# Patient Record
Sex: FEMALE | Race: WHITE | Employment: FULL TIME | URBAN - METROPOLITAN AREA
[De-identification: names, ages, dates, MRNs, and addresses within clinical notes are randomized per-mention and may not be internally consistent; named-entity substitution may affect disease eponyms.]

---

## 2017-02-16 PROBLEM — R07.89 CHEST DISCOMFORT: Status: ACTIVE | Noted: 2017-02-16

## 2017-02-16 PROBLEM — E11.9 TYPE II DIABETES MELLITUS (HCC): Status: ACTIVE | Noted: 2017-02-16

## 2017-02-16 PROBLEM — R55 NEAR SYNCOPE: Status: ACTIVE | Noted: 2017-02-16

## 2017-02-16 PROBLEM — I70.219 ATHEROSCLEROSIS WITH CLAUDICATION OF EXTREMITY (HCC): Status: ACTIVE | Noted: 2017-02-16

## 2017-02-16 PROBLEM — E78.5 DYSLIPIDEMIA: Status: ACTIVE | Noted: 2017-02-16

## 2017-03-10 ENCOUNTER — HOSPITAL ENCOUNTER (OUTPATIENT)
Dept: NON INVASIVE DIAGNOSTICS | Age: 62
Discharge: HOME | End: 2017-03-10
Attending: INTERNAL MEDICINE | Admitting: INTERNAL MEDICINE
Payer: COMMERCIAL

## 2017-03-10 VITALS
SYSTOLIC BLOOD PRESSURE: 140 MMHG | HEIGHT: 66 IN | OXYGEN SATURATION: 96 % | HEART RATE: 70 BPM | RESPIRATION RATE: 27 BRPM | BODY MASS INDEX: 29.25 KG/M2 | DIASTOLIC BLOOD PRESSURE: 78 MMHG | WEIGHT: 182 LBS | TEMPERATURE: 97.9 F

## 2017-03-10 LAB
ANION GAP BLD CALC-SCNC: 9 MMOL/L (ref 7–16)
ATRIAL RATE: 83 BPM
BUN SERPL-MCNC: 10 MG/DL (ref 8–23)
CALCIUM SERPL-MCNC: 8.9 MG/DL (ref 8.3–10.4)
CALCULATED P AXIS, ECG09: 66 DEGREES
CALCULATED R AXIS, ECG10: -176 DEGREES
CALCULATED T AXIS, ECG11: 77 DEGREES
CHLORIDE SERPL-SCNC: 109 MMOL/L (ref 98–107)
CO2 SERPL-SCNC: 26 MMOL/L (ref 21–32)
CREAT SERPL-MCNC: 0.62 MG/DL (ref 0.6–1)
DIAGNOSIS, 93000: NORMAL
DIASTOLIC BP, ECG02: NORMAL MMHG
ERYTHROCYTE [DISTWIDTH] IN BLOOD BY AUTOMATED COUNT: 12.1 % (ref 11.9–14.6)
GLUCOSE BLD STRIP.AUTO-MCNC: 168 MG/DL (ref 65–100)
GLUCOSE SERPL-MCNC: 137 MG/DL (ref 65–100)
HCT VFR BLD AUTO: 38.6 % (ref 35.8–46.3)
HGB BLD-MCNC: 13.2 G/DL (ref 11.7–15.4)
MCH RBC QN AUTO: 34 PG (ref 26.1–32.9)
MCHC RBC AUTO-ENTMCNC: 34.2 G/DL (ref 31.4–35)
MCV RBC AUTO: 99.5 FL (ref 79.6–97.8)
P-R INTERVAL, ECG05: 134 MS
PLATELET # BLD AUTO: 261 K/UL (ref 150–450)
PMV BLD AUTO: 9.8 FL (ref 10.8–14.1)
POTASSIUM SERPL-SCNC: 4.4 MMOL/L (ref 3.5–5.1)
Q-T INTERVAL, ECG07: 396 MS
QRS DURATION, ECG06: 80 MS
QTC CALCULATION (BEZET), ECG08: 465 MS
RBC # BLD AUTO: 3.88 M/UL (ref 4.05–5.25)
SODIUM SERPL-SCNC: 144 MMOL/L (ref 136–145)
SYSTOLIC BP, ECG01: NORMAL MMHG
VENTRICULAR RATE, ECG03: 83 BPM
WBC # BLD AUTO: 4.7 K/UL (ref 4.3–11.1)

## 2017-03-10 PROCEDURE — 93005 ELECTROCARDIOGRAM TRACING: CPT | Performed by: INTERNAL MEDICINE

## 2017-03-10 PROCEDURE — 74011000258 HC RX REV CODE- 258: Performed by: INTERNAL MEDICINE

## 2017-03-10 PROCEDURE — 93660 TILT TABLE EVALUATION: CPT

## 2017-03-10 PROCEDURE — 80048 BASIC METABOLIC PNL TOTAL CA: CPT | Performed by: INTERNAL MEDICINE

## 2017-03-10 PROCEDURE — 74011250637 HC RX REV CODE- 250/637: Performed by: INTERNAL MEDICINE

## 2017-03-10 PROCEDURE — 82962 GLUCOSE BLOOD TEST: CPT

## 2017-03-10 PROCEDURE — 85027 COMPLETE CBC AUTOMATED: CPT | Performed by: INTERNAL MEDICINE

## 2017-03-10 RX ORDER — NITROGLYCERIN 400 UG/1
1 SPRAY ORAL AS NEEDED
Status: DISCONTINUED | OUTPATIENT
Start: 2017-03-10 | End: 2017-03-10 | Stop reason: HOSPADM

## 2017-03-10 RX ORDER — SODIUM CHLORIDE 9 MG/ML
75 INJECTION, SOLUTION INTRAVENOUS CONTINUOUS
Status: DISCONTINUED | OUTPATIENT
Start: 2017-03-10 | End: 2017-03-10 | Stop reason: HOSPADM

## 2017-03-10 RX ADMIN — SODIUM CHLORIDE 75 ML/HR: 900 INJECTION, SOLUTION INTRAVENOUS at 08:00

## 2017-03-10 RX ADMIN — NITROGLYCERIN 1 SPRAY: 400 SPRAY, METERED SUBLINGUAL at 08:35

## 2017-03-10 NOTE — PROGRESS NOTES
Procedure completed, pt did get \"lightheaded\" but no real changes in vital signs. Dr. Jovani Kruse here the entire time, finger bs 168.

## 2017-03-10 NOTE — IP AVS SNAPSHOT
[address] 
[phone] Patient: Nikolai Simeon MRN: [id number] SVF:0/10/6424 Discharge Summary 3/10/2017 Nikolai Simeon MRN[de-identified]  [id number] Admission Information Provider Pager Service Admission Date Expected D/C Date Maritza Gregory MD  CARDIAC CATH LAB 3/10/2017 Actual LOS Patient Class 0 days OUTPATIENT Follow-up Information Follow up With Details Comments Contact Info Mohit Henriquez MD Schedule an appointment as soon as possible for a visit in 2 weeks Evaluate and treat vertigo not responding to conservative therapy. MRI ordered. Augustina Moralez [address] [facility] [address] 
[phone] Maritza Gregory MD Schedule an appointment as soon as possible for a visit in 2 weeks please make sure appointment is after BP monitor (office will call her when monitor available) [address] [facility] [address] 
[phone] Mohit Henriquez MD   [address] [place] [address] [address] [facility] [address] 
[phone] Maritza Gregory MD   [facility] [address] [facility] [address] 
[phone] Current Discharge Medication List  
  
ASK your doctor about these medications Dose & Instructions Dispensing Information Comments Morning Noon Evening Bedtime  
 calcium-magnesium-zinc 333-133-5 mg Tab Your next dose is: Today, Tomorrow Other:  _________ Take  by mouth. Refills:  0  
     
   
   
   
  
 HUMIRA PEN 40 mg/0.8 mL Pnkt Generic drug:  adalimumab Your next dose is: Today, Tomorrow Other:  _________  
   
   
 by SubCUTAneous route. Refills:  0  
     
   
   
   
  
 metFORMIN 1,000 mg tablet Commonly known as:  GLUCOPHAGE Your next dose is: Today, Tomorrow Other:  _________  Dose:  1000 mg  
 Take 1,000 mg by mouth two (2) times a day. Refills:  0 NovoLOG 100 unit/mL injection Generic drug:  insulin aspart Your next dose is: Today, Tomorrow Other:  _________  
   
   
 by SubCUTAneous route. At noon meal takes novolog on a sliding scale Refills:  0  
     
   
   
   
  
 potassium 99 mg tablet Your next dose is: Today, Tomorrow Other:  _________ Dose:  99 mg Take 99 mg by mouth two (2) times a day. Refills:  0 PriLOSEC 20 mg capsule Generic drug:  omeprazole Your next dose is: Today, Tomorrow Other:  _________ Dose:  20 mg Take 20 mg by mouth two (2) times a day. prn Refills:  0  
     
   
   
   
  
 simvastatin 40 mg tablet Commonly known as:  ZOCOR Your next dose is: Today, Tomorrow Other:  _________ Dose:  40 mg Take 40 mg by mouth nightly. Refills:  0  
     
   
   
   
  
 TOUJEO SOLOSTAR SC Your next dose is: Today, Tomorrow Other:  _________  
   
   
 by SubCUTAneous route. Refills:  0  
     
   
   
   
  
 VITAMIN D3 2,000 unit Tab Generic drug:  cholecalciferol (vitamin D3) Your next dose is: Today, Tomorrow Other:  _________ Take  by mouth. Refills:  0 WELCHOL 625 mg tablet Generic drug:  colesevelam  
   
Your next dose is: Today, Tomorrow Other:  _________ Dose:  1875 mg Take 1,875 mg by mouth two (2) times daily (with meals). Refills:  0 General Information Please provide this summary of care documentation to your next provider. Allergies Medium:  Adhesive Unspecified:  Codeine Current Immunizations  Never Reviewed No immunizations on file. Discharge Instructions Discharge Instructions After your tilt table test 
 
 Be sure someone else drives you home. You have no restrictions:  
Return to your previous diet. Increase your fluid & salt intake Return to your previous activities. Continue current medications Call your doctor if: 
 
· You have trouble breathing all of a sudden. · You have chest pain or any pain that spreads to your neck, jaw, or arms. · You have questions or concerns. · You have a fever greater than 101°F. 
· You have increase in dizzy or fainting spells [facility] will call you with a time to  a blood pressure machine, they will also call you with your appointments to see Dr. Kanika Gaspar and Dr. Tavo Razo (ENT) and your appointment date and time for your CT scan. If you have any questions or concerns please call the office at [phone] Discharge Orders None  
  
` Patient Signature:  ____________________________________________________________ Date:  ____________________________________________________________  
  
 Milagro Sleight Provider Signature:  ____________________________________________________________ Date:  ____________________________________________________________

## 2017-03-10 NOTE — DISCHARGE INSTRUCTIONS
After your tilt table test    Be sure someone else drives you home. You have no restrictions:   Return to your previous diet. Increase your fluid & salt intake   Return to your previous activities. Continue current medications       Call your doctor if:    · You have trouble breathing all of a sudden. · You have chest pain or any pain that spreads to your neck, jaw, or arms. · You have questions or concerns. · You have a fever greater than 101°F.  · You have increase in dizzy or fainting spells        [facility] will call you with a time to  a blood pressure machine, they will also call you with your appointments to see Dr. Kathie Osler and Dr. Michelle Haynes (ENT) and your appointment date and time for your CT scan.  If you have any questions or concerns please call the office at [phone]

## 2017-03-10 NOTE — PROCEDURES
Procedure: Tilt Table Monitoring with drug provocation    Referring Physician: Madison Crespo MD  Performing Physician: Rommel Silva MD      Indication: See history and physical for complete details. Briefly, procedure is being performed for a history of dizziness/near syncope. PROCEDURE:  Patient was monitored in the supine position for 10 minutes, then in the 70 degree head upright tilt position for 10 minutes. Having remained stable, patient was then administered 0.4 mg sublingual nitroglycerin and monitored an additional 10 minutes. FINDINGS: Baseline bp and heart rate ranged from 622-622 systolic, and 04-84, respectively. With upright tilt there was  No significant change in hemodynamics. The heart rate ashlyn appropriately and slightly, initial bp feel from 150-136 but remained stable in the 140's thereafter. She experienced worsening of her dizziness, which was present in supine position, during upright motion, and this was associated with several beats of right sided nystagmus. Her symptoms returned to baseline with cessation of motion. After administration of nitroglycerin, there was appropriate modest fall in bp and heart rate. The patient began to feel as though she were \"floating\" 8 minutes into administration. Heart rate was in the 90's and BP briefly ashlyn to 170 but then returned to baseline 130's-140's where is remained. Symptoms improved back to baseline slowly and again with cessation of motion after table returned to supine position. IMPRESSION:    Essentially normal tilt table with and without drug. There were very brief, transient moments of BP drop of up to 20 points, however never below 906 systolic and no correlation to symptoms. Positional vertigo, not responding to therapy    Resting hypertension          RECOMMENDATIONS:    1. Consult her ENT, Dr. aDnia West, for further evaluation and treatment of vertigo.   Will obtain MRI prior to visit.    2. 24 hour ambulatory BP monitor to assess BP control prior to committing to therapy      FOLLOW UP:    1. With me after BP monitor  2. Consult Dr Miles Lamar  3.  With Dr Silvia Casiano as scheduled      Raven Duncan MD  8:59 AM

## 2017-03-10 NOTE — PROGRESS NOTES
Patient received to [address] [place] [address] room # 6  Ambulatory from [facility]. Patient scheduled for Tilt-Table today with Dr Yashira Rao. Procedure reviewed & questions answered, voiced good understanding consent obtained & placed on chart. All medications and medical history reviewed. Will prep patient per orders. Patient & family updated on plan of care.

## 2017-03-10 NOTE — PROGRESS NOTES
Patient discharged to home with spouse. All belongings returned to patient. Discharge instructions reviewed and opportunity for questions provided.

## 2017-03-10 NOTE — H&P
Patient presents for tilt table testing for dizziness present for 3+ weeks, clinically consistent with vertigo but poor response to therapy. History and physical below are reviewed and there has been no significant interval change:    Consult for dizziness, for about 3 weeks has felt as though she is going to pass out all the time. Has had sinus congestion, at her primary care doctor's office apparently had orthostatic blood pressures. She had carotid dopplers on Monday at [address], she hasn't heard results. Worse with standing or turning her head, especially worse turning to the right, has lost her balance to the point of running into a table, holding on the walls at the house. Started with a sinus infection, has been fighting a head cold since December, that part has not gotten better. Has not been taking antihistamine. Some tingling in the right arm. No chest pain. Prior to this was still exercising, walking.               Past Medical History, Past Surgical History, Family history, Social History, and Medications were all reviewed with the patient today and updated as necessary.              Prior to Admission medications    Medication Sig Start Date End Date Taking? Authorizing Provider   [facility]) 625 mg tablet Take 1,875 mg by mouth two (2) times daily (with meals).     Yes Historical Provider   INSULIN GLARGINE,HUM. REC. ANLOG (TOUJEO SOLOSTAR SC) by SubCUTAneous route.     Yes Historical Provider   calcium-magnesium-zinc 333-133-5 mg tab Take by mouth.     Yes Historical Provider   cholecalciferol, vitamin D3, (VITAMIN D3) 2,000 unit tab Take by mouth.     Yes Historical Provider   adalimumab (HUMIRA PEN) 40 mg/0.8 mL pnkt by SubCUTAneous route.     Yes Historical Provider   omeprazole (PRILOSEC) 20 mg capsule Take 20 mg by mouth two (2) times a day.  prn     Yes Historical Provider   metFORMIN (GLUCOPHAGE) 1,000 mg tablet Take 1,000 mg by mouth two (2) times a day.     Yes Historical Provider potassium 99 mg tablet Take 99 mg by mouth two (2) times a day.     Yes Historical Provider   insulin aspart (NOVOLOG) 100 unit/mL injection by SubCUTAneous route. At noon meal takes novolog on a sliding scale     Yes Historical Provider   simvastatin (ZOCOR) 40 mg tablet Take 40 mg by mouth nightly.     Yes Historical Provider            Allergies   Allergen Reactions    Adhesive Rash       Skin irritation    Codeine Nausea and Vomiting           Past Medical History:   Diagnosis Date    Arthritis      Chest discomfort 2/16/2017    Diabetes ([address]      GERD (gastroesophageal reflux disease)              Past Surgical History:   Procedure Laterality Date    HX LAP CHOLECYSTECTOMY        HX TUBAL LIGATION         1981             Family History   Problem Relation Age of Onset    Heart Attack Mother 80    Other Father         mesothelioma             Social History    Substance Use Topics     Smoking status: Former Smoker     Smokeless tobacco: Never Used     Alcohol use 12.6 oz/week       21 Glasses of wine per week         ROS:     Review of Systems   Constitution: Positive for malaise/fatigue. Negative for weight loss. HENT: Negative for headaches and hearing loss. Eyes: Negative for visual disturbance. Cardiovascular: Negative for chest pain. Respiratory: Negative for shortness of breath. Endocrine: Negative for cold intolerance and heat intolerance. Skin: Positive for dry skin. Negative for rash. Musculoskeletal: Negative for joint pain and joint swelling. Gastrointestinal: Negative for abdominal pain, change in bowel habit and melena. Collagenous colitis, controlled   Genitourinary: Negative for dysuria and hematuria. Feels she doesn't urinate as much as she should   Neurological: Positive for dizziness. Negative for focal weakness. Psychiatric/Behavioral: Negative for depression.  The patient is nervous/anxious.             PHYSICAL EXAM:           Visit Vitals    /68  Pulse 88    Ht 5' 6\" (1.676 m)    Wt 178 lb (80.7 kg)    BMI 28.73 kg/m2         Physical Exam   Constitutional: She is oriented to person, place, and time. She appears well-developed and well-nourished. HENT:   Head: Normocephalic and atraumatic. Eyes: Conjunctivae are normal.   Neck: Neck supple. No JVD present. Cardiovascular: Normal rate and regular rhythm. Exam reveals no gallop and no friction rub. No murmur heard. Pulmonary/Chest: Breath sounds normal. No respiratory distress. She has no wheezes. She has no rales. Abdominal: Bowel sounds are normal. She exhibits no distension. There is no tenderness. Musculoskeletal: She exhibits no edema. Neurological: She is alert and oriented to person, place, and time. Skin: Skin is warm and dry.    Psychiatric: She has a normal mood and affect.         BP supine 152/68 Sitting 148/60 Standing 138/ 62 no change in pulse, patient symptomatic with rightward nystagmus     Medical problems and test results were reviewed with the patient today.                     Lab Results   Component Value Date/Time     BUN 10 05/05/2014 04:47 PM            Lab Results   Component Value Date/Time     Creatinine 0.71 05/05/2014 04:47 PM            Lab Results   Component Value Date/Time     Potassium 3.5 05/05/2014 04:47 PM         EKG - Sinus Rhythm 87  rightward axis, low voltage  normal intervals, normal ST segments and T waves           ASSESSMENT and PLAN     Proceed with tilt table testing as planned

## 2017-03-10 NOTE — PROGRESS NOTES
Patient received to [address] [place] [address] room # 6  Ambulatory from [facility]. Patient scheduled for Tilt table today with Dr Kelvin Johnson. Procedure reviewed & questions answered, voiced good understanding consent obtained & placed on chart. All medications and medical history reviewed. Will prep patient per orders. Patient & family updated on plan of care.

## 2017-03-23 ENCOUNTER — HOSPITAL ENCOUNTER (OUTPATIENT)
Dept: MRI IMAGING | Age: 62
Discharge: HOME | End: 2017-03-23
Attending: INTERNAL MEDICINE

## 2017-03-23 DIAGNOSIS — R42 VERTIGO: ICD-10-CM

## 2017-03-23 NOTE — PROGRESS NOTES
Pt came in today for her MRI and had a bad cough. Pt requested to be rescheduled until she is feeling better. Will call drs office and let them know.

## 2017-04-03 ENCOUNTER — HOSPITAL ENCOUNTER (OUTPATIENT)
Dept: MRI IMAGING | Age: 62
Discharge: HOME | End: 2017-04-03
Attending: INTERNAL MEDICINE
Payer: COMMERCIAL

## 2017-04-03 PROCEDURE — 74011250636 HC RX REV CODE- 250/636: Performed by: INTERNAL MEDICINE

## 2017-04-03 PROCEDURE — A9577 INJ MULTIHANCE: HCPCS | Performed by: INTERNAL MEDICINE

## 2017-04-03 PROCEDURE — 70553 MRI BRAIN STEM W/O & W/DYE: CPT

## 2017-04-03 RX ORDER — SODIUM CHLORIDE 0.9 % (FLUSH) 0.9 %
10 SYRINGE (ML) INJECTION
Status: COMPLETED | OUTPATIENT
Start: 2017-04-03 | End: 2017-04-03

## 2017-04-03 RX ADMIN — GADOBENATE DIMEGLUMINE 18 ML: 529 INJECTION, SOLUTION INTRAVENOUS at 20:13

## 2017-04-03 RX ADMIN — Medication 10 ML: at 20:13

## 2019-11-25 RX ORDER — SODIUM CHLORIDE 0.9 % (FLUSH) 0.9 %
5-40 SYRINGE (ML) INJECTION AS NEEDED
Status: CANCELLED | OUTPATIENT
Start: 2019-11-25

## 2019-11-25 RX ORDER — SODIUM CHLORIDE 0.9 % (FLUSH) 0.9 %
5-40 SYRINGE (ML) INJECTION EVERY 8 HOURS
Status: CANCELLED | OUTPATIENT
Start: 2019-11-25

## 2019-12-08 ENCOUNTER — ANESTHESIA EVENT (OUTPATIENT)
Dept: SURGERY | Age: 64
End: 2019-12-08
Payer: COMMERCIAL

## 2019-12-09 ENCOUNTER — HOSPITAL ENCOUNTER (OUTPATIENT)
Age: 64
Setting detail: OUTPATIENT SURGERY
Discharge: HOME | End: 2019-12-09
Attending: ORTHOPAEDIC SURGERY | Admitting: ORTHOPAEDIC SURGERY
Payer: COMMERCIAL

## 2019-12-09 ENCOUNTER — ANESTHESIA (OUTPATIENT)
Dept: SURGERY | Age: 64
End: 2019-12-09
Payer: COMMERCIAL

## 2019-12-09 VITALS
SYSTOLIC BLOOD PRESSURE: 130 MMHG | BODY MASS INDEX: 28.93 KG/M2 | HEIGHT: 66 IN | WEIGHT: 180 LBS | HEART RATE: 72 BPM | RESPIRATION RATE: 14 BRPM | TEMPERATURE: 98.9 F | DIASTOLIC BLOOD PRESSURE: 71 MMHG | OXYGEN SATURATION: 94 %

## 2019-12-09 LAB
GLUCOSE BLD STRIP.AUTO-MCNC: 229 MG/DL (ref 65–100)
GLUCOSE BLD STRIP.AUTO-MCNC: 234 MG/DL (ref 65–100)
HGB BLD-MCNC: 10.7 G/DL (ref 11.7–15.4)
POTASSIUM BLD-SCNC: 4.1 MMOL/L (ref 3.5–5.1)

## 2019-12-09 PROCEDURE — 74011250636 HC RX REV CODE- 250/636: Performed by: ANESTHESIOLOGY

## 2019-12-09 PROCEDURE — 74011000250 HC RX REV CODE- 250: Performed by: ANESTHESIOLOGY

## 2019-12-09 PROCEDURE — 77030006891 HC BLD SHV RESECT STRY -B: Performed by: ORTHOPAEDIC SURGERY

## 2019-12-09 PROCEDURE — 85018 HEMOGLOBIN: CPT

## 2019-12-09 PROCEDURE — 77030004453 HC BUR SHV STRY -B: Performed by: ORTHOPAEDIC SURGERY

## 2019-12-09 PROCEDURE — 77030039425 HC BLD LARYNG TRULITE DISP TELE -A: Performed by: ANESTHESIOLOGY

## 2019-12-09 PROCEDURE — 77030040361 HC SLV COMPR DVT MDII -B: Performed by: ORTHOPAEDIC SURGERY

## 2019-12-09 PROCEDURE — 74011000250 HC RX REV CODE- 250: Performed by: NURSE ANESTHETIST, CERTIFIED REGISTERED

## 2019-12-09 PROCEDURE — 74011250636 HC RX REV CODE- 250/636: Performed by: ORTHOPAEDIC SURGERY

## 2019-12-09 PROCEDURE — 76210000021 HC REC RM PH II 0.5 TO 1 HR: Performed by: ORTHOPAEDIC SURGERY

## 2019-12-09 PROCEDURE — 77030013367: Performed by: ORTHOPAEDIC SURGERY

## 2019-12-09 PROCEDURE — 77030002933 HC SUT MCRYL J&J -A: Performed by: ORTHOPAEDIC SURGERY

## 2019-12-09 PROCEDURE — 76060000033 HC ANESTHESIA 1 TO 1.5 HR: Performed by: ORTHOPAEDIC SURGERY

## 2019-12-09 PROCEDURE — 76010010054 HC POST OP PAIN BLOCK: Performed by: ORTHOPAEDIC SURGERY

## 2019-12-09 PROCEDURE — 77030037088 HC TUBE ENDOTRACH ORAL NSL COVD-A: Performed by: ANESTHESIOLOGY

## 2019-12-09 PROCEDURE — 77030003666 HC NDL SPINAL BD -A: Performed by: ORTHOPAEDIC SURGERY

## 2019-12-09 PROCEDURE — 77030040936 HC WND COBLATN S&N -C: Performed by: ORTHOPAEDIC SURGERY

## 2019-12-09 PROCEDURE — 74011250636 HC RX REV CODE- 250/636: Performed by: NURSE ANESTHETIST, CERTIFIED REGISTERED

## 2019-12-09 PROCEDURE — 82962 GLUCOSE BLOOD TEST: CPT

## 2019-12-09 PROCEDURE — 76942 ECHO GUIDE FOR BIOPSY: CPT | Performed by: ORTHOPAEDIC SURGERY

## 2019-12-09 PROCEDURE — 77030003602 HC NDL NRV BLK BBMI -B: Performed by: ANESTHESIOLOGY

## 2019-12-09 PROCEDURE — 77030019605: Performed by: ORTHOPAEDIC SURGERY

## 2019-12-09 PROCEDURE — 84132 ASSAY OF SERUM POTASSIUM: CPT

## 2019-12-09 PROCEDURE — 77030018836 HC SOL IRR NACL ICUM -A: Performed by: ORTHOPAEDIC SURGERY

## 2019-12-09 PROCEDURE — 76210000006 HC OR PH I REC 0.5 TO 1 HR: Performed by: ORTHOPAEDIC SURGERY

## 2019-12-09 PROCEDURE — 76010000161 HC OR TIME 1 TO 1.5 HR INTENSV-TIER 1: Performed by: ORTHOPAEDIC SURGERY

## 2019-12-09 RX ORDER — ONDANSETRON 2 MG/ML
INJECTION INTRAMUSCULAR; INTRAVENOUS AS NEEDED
Status: DISCONTINUED | OUTPATIENT
Start: 2019-12-09 | End: 2019-12-09 | Stop reason: HOSPADM

## 2019-12-09 RX ORDER — OXYCODONE HYDROCHLORIDE 5 MG/1
5 TABLET ORAL
Status: DISCONTINUED | OUTPATIENT
Start: 2019-12-09 | End: 2019-12-09 | Stop reason: HOSPADM

## 2019-12-09 RX ORDER — LIDOCAINE HYDROCHLORIDE 20 MG/ML
INJECTION, SOLUTION EPIDURAL; INFILTRATION; INTRACAUDAL; PERINEURAL AS NEEDED
Status: DISCONTINUED | OUTPATIENT
Start: 2019-12-09 | End: 2019-12-09 | Stop reason: HOSPADM

## 2019-12-09 RX ORDER — CEFAZOLIN SODIUM/WATER 2 G/20 ML
2 SYRINGE (ML) INTRAVENOUS
Status: COMPLETED | OUTPATIENT
Start: 2019-12-09 | End: 2019-12-09

## 2019-12-09 RX ORDER — HYDROMORPHONE HYDROCHLORIDE 2 MG/ML
0.5 INJECTION, SOLUTION INTRAMUSCULAR; INTRAVENOUS; SUBCUTANEOUS
Status: DISCONTINUED | OUTPATIENT
Start: 2019-12-09 | End: 2019-12-09 | Stop reason: HOSPADM

## 2019-12-09 RX ORDER — NEOSTIGMINE METHYLSULFATE 1 MG/ML
INJECTION, SOLUTION INTRAVENOUS AS NEEDED
Status: DISCONTINUED | OUTPATIENT
Start: 2019-12-09 | End: 2019-12-09 | Stop reason: HOSPADM

## 2019-12-09 RX ORDER — OXYCODONE HYDROCHLORIDE 5 MG/1
10 TABLET ORAL
Status: DISCONTINUED | OUTPATIENT
Start: 2019-12-09 | End: 2019-12-09 | Stop reason: HOSPADM

## 2019-12-09 RX ORDER — KETOROLAC TROMETHAMINE 30 MG/ML
INJECTION, SOLUTION INTRAMUSCULAR; INTRAVENOUS AS NEEDED
Status: DISCONTINUED | OUTPATIENT
Start: 2019-12-09 | End: 2019-12-09 | Stop reason: HOSPADM

## 2019-12-09 RX ORDER — NALOXONE HYDROCHLORIDE 0.4 MG/ML
0.1 INJECTION, SOLUTION INTRAMUSCULAR; INTRAVENOUS; SUBCUTANEOUS
Status: DISCONTINUED | OUTPATIENT
Start: 2019-12-09 | End: 2019-12-09 | Stop reason: HOSPADM

## 2019-12-09 RX ORDER — MIDAZOLAM HYDROCHLORIDE 1 MG/ML
2 INJECTION, SOLUTION INTRAMUSCULAR; INTRAVENOUS ONCE
Status: COMPLETED | OUTPATIENT
Start: 2019-12-09 | End: 2019-12-09

## 2019-12-09 RX ORDER — SODIUM CHLORIDE 0.9 % (FLUSH) 0.9 %
5-40 SYRINGE (ML) INJECTION AS NEEDED
Status: DISCONTINUED | OUTPATIENT
Start: 2019-12-09 | End: 2019-12-09 | Stop reason: HOSPADM

## 2019-12-09 RX ORDER — SODIUM CHLORIDE, SODIUM LACTATE, POTASSIUM CHLORIDE, CALCIUM CHLORIDE 600; 310; 30; 20 MG/100ML; MG/100ML; MG/100ML; MG/100ML
75 INJECTION, SOLUTION INTRAVENOUS CONTINUOUS
Status: DISCONTINUED | OUTPATIENT
Start: 2019-12-09 | End: 2019-12-09 | Stop reason: HOSPADM

## 2019-12-09 RX ORDER — ROCURONIUM BROMIDE 10 MG/ML
INJECTION, SOLUTION INTRAVENOUS AS NEEDED
Status: DISCONTINUED | OUTPATIENT
Start: 2019-12-09 | End: 2019-12-09 | Stop reason: HOSPADM

## 2019-12-09 RX ORDER — PROPOFOL 10 MG/ML
INJECTION, EMULSION INTRAVENOUS AS NEEDED
Status: DISCONTINUED | OUTPATIENT
Start: 2019-12-09 | End: 2019-12-09 | Stop reason: HOSPADM

## 2019-12-09 RX ORDER — FENTANYL CITRATE 50 UG/ML
100 INJECTION, SOLUTION INTRAMUSCULAR; INTRAVENOUS ONCE
Status: COMPLETED | OUTPATIENT
Start: 2019-12-09 | End: 2019-12-09

## 2019-12-09 RX ORDER — SODIUM CHLORIDE 0.9 % (FLUSH) 0.9 %
5-40 SYRINGE (ML) INJECTION EVERY 8 HOURS
Status: DISCONTINUED | OUTPATIENT
Start: 2019-12-09 | End: 2019-12-09 | Stop reason: HOSPADM

## 2019-12-09 RX ORDER — GLYCOPYRROLATE 0.2 MG/ML
INJECTION INTRAMUSCULAR; INTRAVENOUS AS NEEDED
Status: DISCONTINUED | OUTPATIENT
Start: 2019-12-09 | End: 2019-12-09 | Stop reason: HOSPADM

## 2019-12-09 RX ORDER — DIPHENHYDRAMINE HYDROCHLORIDE 50 MG/ML
12.5 INJECTION, SOLUTION INTRAMUSCULAR; INTRAVENOUS
Status: DISCONTINUED | OUTPATIENT
Start: 2019-12-09 | End: 2019-12-09 | Stop reason: HOSPADM

## 2019-12-09 RX ORDER — LIDOCAINE HYDROCHLORIDE 10 MG/ML
0.1 INJECTION INFILTRATION; PERINEURAL AS NEEDED
Status: DISCONTINUED | OUTPATIENT
Start: 2019-12-09 | End: 2019-12-09 | Stop reason: HOSPADM

## 2019-12-09 RX ORDER — FLUMAZENIL 0.1 MG/ML
0.2 INJECTION INTRAVENOUS AS NEEDED
Status: DISCONTINUED | OUTPATIENT
Start: 2019-12-09 | End: 2019-12-09 | Stop reason: HOSPADM

## 2019-12-09 RX ORDER — EPINEPHRINE 1 MG/ML
INJECTION INTRAMUSCULAR; INTRAVENOUS; SUBCUTANEOUS AS NEEDED
Status: DISCONTINUED | OUTPATIENT
Start: 2019-12-09 | End: 2019-12-09 | Stop reason: HOSPADM

## 2019-12-09 RX ORDER — MIDAZOLAM HYDROCHLORIDE 1 MG/ML
2 INJECTION, SOLUTION INTRAMUSCULAR; INTRAVENOUS
Status: DISCONTINUED | OUTPATIENT
Start: 2019-12-09 | End: 2019-12-09 | Stop reason: HOSPADM

## 2019-12-09 RX ADMIN — MIDAZOLAM 2 MG: 1 INJECTION INTRAMUSCULAR; INTRAVENOUS at 09:11

## 2019-12-09 RX ADMIN — FENTANYL CITRATE 50 MCG: 50 INJECTION, SOLUTION INTRAMUSCULAR; INTRAVENOUS at 09:11

## 2019-12-09 RX ADMIN — LIDOCAINE HYDROCHLORIDE 40 MG: 20 INJECTION, SOLUTION EPIDURAL; INFILTRATION; INTRACAUDAL; PERINEURAL at 09:31

## 2019-12-09 RX ADMIN — GLYCOPYRROLATE 0.4 MG: 0.2 INJECTION, SOLUTION INTRAMUSCULAR; INTRAVENOUS at 10:22

## 2019-12-09 RX ADMIN — ROPIVACAINE HYDROCHLORIDE 30 ML: 5 INJECTION, SOLUTION EPIDURAL; INFILTRATION; PERINEURAL at 09:15

## 2019-12-09 RX ADMIN — ROCURONIUM BROMIDE 40 MG: 10 INJECTION, SOLUTION INTRAVENOUS at 09:32

## 2019-12-09 RX ADMIN — PHENYLEPHRINE HYDROCHLORIDE 100 MCG: 10 INJECTION INTRAVENOUS at 10:14

## 2019-12-09 RX ADMIN — Medication 3 MG: at 10:22

## 2019-12-09 RX ADMIN — PROPOFOL 200 MG: 10 INJECTION, EMULSION INTRAVENOUS at 09:31

## 2019-12-09 RX ADMIN — Medication 2 G: at 09:45

## 2019-12-09 RX ADMIN — KETOROLAC TROMETHAMINE 30 MG: 30 INJECTION, SOLUTION INTRAMUSCULAR; INTRAVENOUS at 10:15

## 2019-12-09 RX ADMIN — ONDANSETRON 4 MG: 2 INJECTION INTRAMUSCULAR; INTRAVENOUS at 10:15

## 2019-12-09 RX ADMIN — SODIUM CHLORIDE, SODIUM LACTATE, POTASSIUM CHLORIDE, AND CALCIUM CHLORIDE 75 ML/HR: 600; 310; 30; 20 INJECTION, SOLUTION INTRAVENOUS at 08:36

## 2019-12-09 NOTE — BRIEF OP NOTE
BRIEF OPERATIVE NOTE    Date of Procedure: 12/9/2019   Preoperative Diagnosis: Strain of muscle(s) and tendon(s) of the rotator cuff of left shoulder, initial encounter [S46.012A]  Subacromial impingement of left shoulder [M75.42]  Postoperative Diagnosis: Strain of muscle(s) and tendon(s) of the rotator cuff of left shoulder, initial encounter [S46.012A]  Subacromial impingement of left shoulder [M75.42]    Procedure(s):  SHOULDER ARTHROSCOPY DECOMPRESSION PARTIAL ROTATOR CUFF REPAIR/ LEFT/DISTAL CLAVICLE RESECTION  Surgeon(s) and Role:      Alin Kevin MD - Primary         Surgical Assistant:       Surgical Staff:  Circ-1: Robinson Olson RN  Scrub Tech-1: Polly Mckeon  Event Time In Time Out   Incision Start 4025    Incision Close       Anesthesia: General   Estimated Blood Loss:     Specimens: * No specimens in log *   Findings:      Complications:     Implants: * No implants in log *

## 2019-12-09 NOTE — PROGRESS NOTES
's Pre-op visit requested by patient. Conveyed care and concern for patient and family. Offered prayer as requested for patient, family, and staff.     Fleicia Hopkins MDiv, BS  Board Certified

## 2019-12-09 NOTE — PERIOP NOTES
Pt discharged home with rx x1 (percocet), wound/shoulder immobilizer care instructions and follow up information. Verbal understanding of all instructions by patient and spouse Demian Teague. All questions answered prior to discharge. Pt dressed in button down shirt. All belongings taken with pt. Pt taken to car via wheelchair by Guera Connor, [address] [place] [address].

## 2019-12-09 NOTE — PERIOP NOTES
Noted HGB \"per protocol\" in PAT note, no order for it. Checked with Dr. Nalini Pacheco- no hemoglobin needed. Signed By: Umang Andino     December 9, 2019      Noted Hx of anemia, will do HGB per order.    Signed By: Umang Andino     December 9, 2019

## 2019-12-09 NOTE — OP NOTES
[facility]  OPERATIVE REPORT    Name:  Reid Salter  MR#:  [id number]  :  1955  ACCOUNT #:  [de-identified]  DATE OF SERVICE:  2019    PREOPERATIVE DIAGNOSIS:  Possible rotator cuff tear of the left shoulder. POSTOPERATIVE DIAGNOSES:  1.  Bursal tear of the left rotator cuff. 2.  Acromioclavicular joint arthritis. 3.  Impingement of the left shoulder. PROCEDURE PERFORMED:  1. Arthroscopic resection of the distal clavicle - 00550.  2.  Debridement of bursal tear of the rotator cuff - 08853.  3.  Subacromial decompression - 78182, of the left shoulder. SURGEON:  Kobi Trejo MD    ASSISTANT:  None. ANESTHESIA:  General.    COMPLICATIONS:  None. SPECIMENS REMOVED:  None. IMPLANTS:  None. ESTIMATED BLOOD LOSS:  Minimal.    PROCEDURE:  After an adequate level of general anesthesia was obtained, the patient had a block per Anesthesia for postop pain management and received antibiotics. She had full range of motion of the shoulder. The joint was distended posteriorly with saline. The arthroscope was introduced. The articular surface looked fine. The biceps looked fine. There was some mild degeneration of the labrum which was debrided through an anterior portal in the soft spot, but overall good integrity and there was no peel back. The undersurface of the rotator cuff looked good. The subscapularis looked fine. The arthroscope was placed in the subacromial space. A lateral portal was made. The patient had some hooking of the acromion anteriorly and laterally and a decompression was performed with the shaver and the manan. There were degenerative changes noted on the end of the distal clavicle and in particular an osteophyte under the inferior portion of the distal clavicle. The rotator cuff was well visualized. A bursectomy was performed and the rotator cuff was probed extensively.   There was some bursal tearing but overall there was still good integrity and debridement was performed with the shaver but there was not enough of tearing to warrant a repair at this point. She still had good integrity. It was elected to perform a resection of the distal clavicle. Circumferentially, the soft tissue was removed and this was performed through the three working portals that were interchanged. The osteophytes were removed from the inferior portion about 1 cm from the lateral portal with a manan, and then with pressure applied superiorly, soft tissue was removed circumferentially, and then the superior portion was removed with the manan placed in the anterior and posterior portal.  The wound was then copiously irrigated. Steri-Strips and sterile dressings were applied. She tolerated the procedure well.       MD STONEY [id number]  D:  12/09/2019 10:22  T:  12/09/2019 10:49  JOB #:  [id number]  CC:  [facility]

## 2019-12-09 NOTE — ANESTHESIA PROCEDURE NOTES
Peripheral Block    Start time: 12/9/2019 9:12 AM  End time: 12/9/2019 9:15 AM  Performed by: Aris Bentley MD  Authorized by: Aris Bentley MD       Pre-procedure: Indications: at surgeon's request and post-op pain management    Preanesthetic Checklist: patient identified, risks and benefits discussed, site marked, timeout performed, anesthesia consent given and patient being monitored    Timeout Time: 09:11          Block Type:   Block Type:   Interscalene  Laterality:  Left  Monitoring:  Standard ASA monitoring, continuous pulse ox, frequent vital sign checks, heart rate, oxygen and responsive to questions  Injection Technique:  Single shot  Procedures: ultrasound guided and nerve stimulator    Patient Position: supine (30 degree upright)  Prep: chlorhexidine    Location:  Interscalene  Needle Type:  Stimuplex  Needle Gauge:  21 G  Needle Localization:  Nerve stimulator and ultrasound guidance  Motor Response: minimal motor response >0.4 mA    Motor Response comment:  Motor twitch extinguished between 0.2-0.5 mA    Assessment:  Number of attempts:  1  Injection Assessment:  Incremental injection every 5 mL, negative aspiration for CSF, no paresthesia, ultrasound image on chart, local visualized surrounding nerve on ultrasound, negative aspiration for blood and no intravascular symptoms  Patient tolerance:  Patient tolerated the procedure well with no immediate complications

## 2019-12-09 NOTE — DISCHARGE INSTRUCTIONS
Post-Operative Instructions   For  Shoulder Arthroscopy  Phone:  [phone]    1. Apply ice to the shoulder as needed. 2. You may shower after the arthroscopy. Keep dressings in place for the first three days and do not get them wet. After three days, you may remove the dressings and leave the steri-strip bandages in place; they will peel off naturally. 3. You may discontinue use of your sling and begin active range of motion of the elbow as tolerated by pain, unless you are instructed otherwise. Do not lift arm by your side for 4 weeks. 4. Begin therapy as ordered. 5. Use any pain medication as instructed. You should take your pain medication as soon as you feel the anesthetic wearing off. Do not wait until you are in severe pain to begin taking your pain medication. 6. You may have some side effects from your pain medication. If you have nausea, try taking your medication with food. For itching, you may take over the counter Benadryl. 7. You may have been given a prescription for Phenergan. This medication is used for nausea and vomiting. You do not need to get this prescription filled unless you have a problem. 8. If you have a problem, please call [address] [place] [address] at [phone], P.A.     TYPICAL SIDE EFFECTS OF PAIN MEDICATION:  *    Constipation: Drink lots of fluids. Over the counter stool softener if needed. *    Nausea: Take pain medication with food. Call your doctor with persistent nausea. ACTIVITY  · As tolerated and as directed by your doctor. · Bathe or shower as directed by your doctor. DIET  · Day of surgery: Clear liquids until no nausea or vomiting; small portion, light diet [place] foods (ex: baked chicken, plain rice, grits, scrambled eggs, toast). Nothing greasy, fried or spicy today. · Advance to regular diet on second day, unless your doctor orders otherwise.    · If nausea and vomiting continues, call your doctor. PAIN  · Take pain medication as directed by your doctor. · DO NOT take aspirin or blood thinners unless directed by your doctor. CALL YOUR DOCTOR IF    s Call your doctor if pain is NOT relieved by medication.   s Excessive bleeding that does not stop after holding pressure over the area  · Temperature of 101 degrees F or above  · Excessive redness, swelling or bruising, and/ or green or yellow, smelly discharge from incision    AFTER ANESTHESIA   · For the first 24 hours: DO NOT Drive, Drink alcoholic beverages, or Make important decisions. · Be aware of dizziness following anesthesia and while taking pain medication. DISCHARGE SUMMARY from Nurse    PATIENT INSTRUCTIONS:    After general anesthesia or intravenous sedation, for 24 hours or while taking prescription Narcotics:  · Limit your activities  · Do not drive and operate hazardous machinery  · Do not make important personal or business decisions  · Do  not drink alcoholic beverages  · If you have not urinated within 8 hours after discharge, please contact your surgeon on call. *  Please give a list of your current medications to your Primary Care Provider. *  Please update this list whenever your medications are discontinued, doses are      changed, or new medications (including over-the-counter products) are added. *  Please carry medication information at all times in case of emergency situations. Preventing Infection at Home  We care about preventing infection and avoiding the spread of germs - not only when you are in the hospital but also when you return home. When you return home from the hospital, its important to take the following steps to help prevent infection and avoid spreading germs that could infect you and others. Ask everyone in your home to follow these guidelines, too.     Clean Your Hands  · Clean your hands whenever your hands are visibly dirty, before you eat, before or after touching your mouth, nose or eyes, and before preparing food. Clean them after contact with body fluids, using the restroom, touching animals or changing diapers. · When washing hands, wet them with warm water and work up a lather. Rub hands for at least 15 seconds, then rinse them and pat them dry with a clean towel or paper towel. · When using hand sanitizers, it should take about 15 seconds to rub your hands dry. If not, you probably didnt apply enough . Cover Your Sneeze or Cough  Germs are released into the air whenever you sneeze or cough. To prevent the spread of infection:  · Turn away from other people before coughing or sneezing. · Cover your mouth or nose with a tissue when you cough or sneeze. Put the tissue in the trash. · If you dont have a tissue, cough or sneeze into your upper sleeve, not your hands. · Always clean your hands after coughing or sneezing. Care for Wounds  Your skin is your bodys first line of defense against germs, but an open wound leaves an easy way for germs to enter your body. To prevent infection:  · Clean your hands before and after changing wound dressings, and wear gloves to change dressings if recommended by your doctor. · Take special care with IV lines or other devices inserted into the body. If you must touch them, clean your hands first.  · Follow any specific instructions from your doctor to care for your wounds. Contact your doctor if you experience any signs of infection, such as fever or increased redness at the surgical or wound site. Keep a Clean Home  · Clean or wipe commonly touched hard surfaces like door handles, sinks, tabletops, phones and TV remotes. · Use products labeled disinfectant to kill harmful bacteria and viruses. · Use a clean cloth or paper towel to clean and dry surfaces. Wiping surfaces with a dirty dishcloth, sponge or towel will only spread germs.   · Never share toothbrushes, manrique, drinking glasses, utensils, razor blades, face cloths or bath towels to avoid spreading germs. · Be sure that the linens that you sleep on are clean. · Keep pets away from wounds and wash your hands after touching pets, their toys or bedding. We care about you and your health. Remember, preventing infections is a team effort between you, your family, friends and health care providers. These are general instructions for a healthy lifestyle:    No smoking/ No tobacco products/ Avoid exposure to second hand smoke    Surgeon General's Warning:  Quitting smoking now greatly reduces serious risk to your health. Obesity, smoking, and sedentary lifestyle greatly increases your risk for illness    A healthy diet, regular physical exercise & weight monitoring are important for maintaining a healthy lifestyle    You may be retaining fluid if you have a history of heart failure or if you experience any of the following symptoms:  Weight gain of 3 pounds or more overnight or 5 pounds in a week, increased swelling in our hands or feet or shortness of breath while lying flat in bed. Please call your doctor as soon as you notice any of these symptoms; do not wait until your next office visit. Recognize signs and symptoms of STROKE:    F-face looks uneven    A-arms unable to move or move unevenly    S-speech slurred or non-existent    T-time-call 911 as soon as signs and symptoms begin-DO NOT go       Back to bed or wait to see if you get better-TIME IS BRAIN.

## 2019-12-09 NOTE — ANESTHESIA PREPROCEDURE EVALUATION
Relevant Problems   No relevant active problems       Anesthetic History   No history of anesthetic complications            Review of Systems / Medical History  Patient summary reviewed and pertinent labs reviewed    Pulmonary  Within defined limits                 Neuro/Psych   Within defined limits           Cardiovascular              Hyperlipidemia    Exercise tolerance: >4 METS     GI/Hepatic/Renal     GERD: well controlled           Endo/Other    Diabetes: well controlled, type 2, using insulin    Arthritis     Other Findings              Physical Exam    Airway  Mallampati: II  TM Distance: > 6 cm  Neck ROM: normal range of motion   Mouth opening: Normal     Cardiovascular    Rhythm: regular  Rate: normal         Dental  No notable dental hx       Pulmonary  Breath sounds clear to auscultation               Abdominal         Other Findings            Anesthetic Plan    ASA: 2  Anesthesia type: general      Post-op pain plan if not by surgeon: peripheral nerve block single      Anesthetic plan and risks discussed with: Patient and Spouse

## 2019-12-09 NOTE — ANESTHESIA POSTPROCEDURE EVALUATION
Procedure(s):  SHOULDER ARTHROSCOPY DECOMPRESSION PARTIAL ROTATOR CUFF REPAIR/ LEFT/DISTAL CLAVICLE RESECTION. general    Anesthesia Post Evaluation      Multimodal analgesia: multimodal analgesia used between 6 hours prior to anesthesia start to PACU discharge  Patient location during evaluation: PACU  Patient participation: complete - patient participated  Level of consciousness: awake  Pain management: adequate  Airway patency: patent  Anesthetic complications: no  Cardiovascular status: acceptable and hemodynamically stable  Respiratory status: acceptable  Hydration status: acceptable  Comments: Acceptable for discharge from PACU. Post anesthesia nausea and vomiting:  none      Vitals Value Taken Time   /63 12/9/2019 11:01 AM   Temp 37.2 °C (98.9 °F) 12/9/2019 10:31 AM   Pulse 85 12/9/2019 11:05 AM   Resp 14 12/9/2019 10:50 AM   SpO2 91 % 12/9/2019 11:05 AM   Vitals shown include unvalidated device data.

## 2019-12-09 NOTE — H&P
Outpatient Surgery History and Physical:  Sita Herndon was seen and examined. CHIEF COMPLAINT:    Left shoulder . PE:   There were no vitals taken for this visit. Heart:   Regular rhythm      Lungs:  Are clear      Past Medical History:    Patient Active Problem List    Diagnosis    Type II diabetes mellitus ([address]     diagnosed 2001, recently poorly controlled. Has gained 20 pounds in less than a year.  Dyslipidemia    Atherosclerosis with claudication of extremity ([address]    Chest discomfort    Near syncope     Mar 2017 - Tilt table - patient demonstrated symptoms of near syncope but without a change in bp or heart rate. Fingerstick glucose was 160.  24 ambulatory BP monitor - Overall average BP  Is 142/79. There is a single reading reported as 83/70 at 11:00. Given the very narrow pulse pressure, this reading is questionable. All other readings range 489-119 systolic with a single outlier of 397 systolic at 9395. Surgical History:   Past Surgical History:   Procedure Laterality Date    HX COLONOSCOPY      every 2 years - next due 2020- Dr. Reynaldo Ramos HX WISDOM TEETH EXTRACTION      TILT TABLE EVAL W/WO DRUG  3/10/2017            Social History: Patient  reports that she quit smoking about 18 years ago. She has a 43.50 pack-year smoking history. She has never used smokeless tobacco. She reports current alcohol use of about 21.0 standard drinks of alcohol per week. She reports that she does not use drugs.     Family History:   Family History   Problem Relation Age of Onset    Heart Attack Mother 80    Other Father         mesothelioma    No Known Problems Sister     Heart Disease Brother     Heart Attack Brother     No Known Problems Brother        Allergies: Reviewed per EMR  Allergies   Allergen Reactions    Adhesive Rash     Skin irritation    Codeine Nausea and Vomiting       Medications:    No current facility-administered medications on file prior to encounter. Current Outpatient Medications on File Prior to Encounter   Medication Sig   [facility]. REC. ANLOG (TOUJEO SOLOSTAR SC) 74 Units by SubCUTAneous route every morning.  calcium-magnesium-zinc 333-133-5 mg tab Take 1 Tab by mouth two (2) times a day.  cholecalciferol, vitamin D3, (VITAMIN D3) 2,000 unit tab Take 1 Tab by mouth daily.  omeprazole (PRILOSEC) 20 mg capsule Take 20 mg by mouth two (2) times a day. prn    metFORMIN (GLUCOPHAGE) 1,000 mg tablet Take 1,000 mg by mouth two (2) times a day.  potassium 99 mg tablet Take 99 mg by mouth two (2) times a day.  insulin aspart (NOVOLOG) 100 unit/mL injection by SubCUTAneous route. At morning meal takes novolog on a sliding scale    simvastatin (ZOCOR) 40 mg tablet Take 40 mg by mouth nightly. The surgery is planned for the shoulder. History and physical has been reviewed. The patient has been examined. There have been no significant clinical changes since the completion of the originally dated History and Physical.  Patient identified by surgeon; surgical site was confirmed by patient and surgeon. The patient is here today for outpatient surgery. I have examined the patient, no changes are noted in the patient's medical status. Necessity for the procedure/care is still present and the history and physical above is current. See the office notes for the full long term history of the problem. Please see the recent office notes for the musculoskeletal examination.     Signed By: Siri Ramos MD     December 9, 2019 7:10 AM

## 2020-08-10 ENCOUNTER — HOSPITAL ENCOUNTER (OUTPATIENT)
Dept: LAB | Age: 65
Discharge: HOME | End: 2020-08-10

## 2020-08-10 PROCEDURE — 88305 TISSUE EXAM BY PATHOLOGIST: CPT

## 2021-04-26 PROBLEM — M65.312 TRIGGER THUMB, LEFT THUMB: Status: ACTIVE | Noted: 2017-12-05

## 2021-04-26 PROBLEM — K52.9 NONINFECTIVE GASTROENTERITIS AND COLITIS, UNSPECIFIED: Status: ACTIVE | Noted: 2017-11-30

## 2021-04-26 PROBLEM — S80.02XA CONTUSION OF LEFT KNEE: Status: ACTIVE | Noted: 2017-01-20

## 2022-01-04 ENCOUNTER — TRANSCRIBE ORDER (OUTPATIENT)
Dept: SCHEDULING | Age: 67
End: 2022-01-04

## 2022-01-04 DIAGNOSIS — R11.2 NAUSEA AND VOMITING: Primary | ICD-10-CM

## 2022-03-18 PROBLEM — R55 NEAR SYNCOPE: Status: ACTIVE | Noted: 2017-02-16

## 2022-03-18 PROBLEM — E11.9 TYPE II DIABETES MELLITUS (HCC): Status: ACTIVE | Noted: 2017-02-16

## 2022-03-18 PROBLEM — S80.02XA CONTUSION OF LEFT KNEE: Status: ACTIVE | Noted: 2017-01-20

## 2022-03-18 PROBLEM — R07.89 CHEST DISCOMFORT: Status: ACTIVE | Noted: 2017-02-16

## 2022-03-19 PROBLEM — K52.9 NONINFECTIVE GASTROENTERITIS AND COLITIS, UNSPECIFIED: Status: ACTIVE | Noted: 2017-11-30

## 2022-03-19 PROBLEM — I70.219 ATHEROSCLEROSIS WITH CLAUDICATION OF EXTREMITY (HCC): Status: ACTIVE | Noted: 2017-02-16

## 2022-03-19 PROBLEM — M65.312 TRIGGER THUMB, LEFT THUMB: Status: ACTIVE | Noted: 2017-12-05

## 2022-03-19 PROBLEM — E78.5 DYSLIPIDEMIA: Status: ACTIVE | Noted: 2017-02-16

## 2023-09-12 ENCOUNTER — PROCEDURE VISIT (OUTPATIENT)
Dept: NEUROLOGY | Age: 68
End: 2023-09-12
Payer: MEDICARE

## 2023-09-12 VITALS — HEART RATE: 70 BPM | BODY MASS INDEX: 28.46 KG/M2 | WEIGHT: 171 LBS | OXYGEN SATURATION: 93 %

## 2023-09-12 DIAGNOSIS — R20.2 PARESTHESIA OF BOTH HANDS: Primary | ICD-10-CM

## 2023-09-12 DIAGNOSIS — G56.22 CUBITAL TUNNEL SYNDROME ON LEFT: ICD-10-CM

## 2023-09-12 DIAGNOSIS — G56.03 CARPAL TUNNEL SYNDROME, BILATERAL: ICD-10-CM

## 2023-09-12 PROCEDURE — 95913 NRV CNDJ TEST 13/> STUDIES: CPT | Performed by: PSYCHIATRY & NEUROLOGY

## 2023-09-12 PROCEDURE — 95885 MUSC TST DONE W/NERV TST LIM: CPT | Performed by: PSYCHIATRY & NEUROLOGY

## 2023-09-12 NOTE — PROGRESS NOTES
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 Left to even ulnar distribution. No atrophy. No Natalie. Has spasmodic dysphonia. At times she notes when she sleeps she might be sleeping with her hand under the pillow on her elbows. Referring: Dr. Nelida Morel  Technologist: Rere Salcedo  Height: 5 foot 5 inch        Summary             selected muscles of the upper extremities by needle EMG with conduction velocities. Controlled environmental factors / EMG lab. Temperature. NCV : sensory segments:    Abnormal = the left and right distal SCV of the median nerves are slowed slightly. Normal bilateral ulnar bilateral radial SC segments. V  NCV transcarpal sensory segments:     Abnormal = bilateral transcarpal median segments are slowed SCV with normal bilateral transcarpal ulnar segments. Peak difference of latency[de-identified] Right peak is prolonged at 0.79 ms; left peak difference increased at 0.83 ms (U = 0.20 ms). NCV Motor MCV segments:     * A single abnormal segment is the left ulnar to FDI is slowed mildly to moderately across the elbow. Normal bilateral median and bilateral ulnar to the ADM MCV. The right ulnar to the FDI is normal.  F-wave studies:   Normal bilateral median and bilateral ulnar F waves. NEEDLE EMG:   Tested muscles[de-identified]    Left FCU and bilateral FDI APB ADM = normal = equal  Normal insertional activity and interference pattern/recruitment. No fasciculations fibrillations positive sharp waves. Normal MUP. No BSS AP. No giant MUP. No myotonia. No upper motor neuron sign. INTERPRETATION:    THESE FINDINGS ARE ELECTROPHYSIOLOGIC EVIDENCE OF NORMAL LEFT ULNAR NEUROPATHY AT THE ELBOW THAT IS VERY EARLY POSSIBLY ENTRAPPED. BILATERAL EARLY ENTRAPMENT OF THE MEDIAN NERVES AT THE WRIST ALSO IS PRESENT.     NO MYOPATHY MYOTONIA OR

## (undated) DEVICE — SHLDR ARTHO LEE: Brand: MEDLINE INDUSTRIES, INC.

## (undated) DEVICE — (D)PREP SKN CHLRAPRP APPL 26ML -- CONVERT TO ITEM 371833

## (undated) DEVICE — SET IRRIG DST FLX M CONN

## (undated) DEVICE — DRAPE,SHOULDER,BEACH CHAIR,STERILE: Brand: MEDLINE

## (undated) DEVICE — WEREWOLF FLOW 90 COBLATION WAND: Brand: COBLATION

## (undated) DEVICE — SOLUTION IRRIG 3000ML 0.9% SOD CHL FLX CONT 0797208] ICU MEDICAL INC]

## (undated) DEVICE — [AGGRESSIVE 6-FLUTE BARREL BUR, ARTHROSCOPIC SHAVER BLADE,  DO NOT RESTERILIZE,  DO NOT USE IF PACKAGE IS DAMAGED,  KEEP DRY,  KEEP AWAY FROM SUNLIGHT]: Brand: FORMULA

## (undated) DEVICE — NDL FLTR TIP 5 MIC 18GX1.5IN --

## (undated) DEVICE — NDL SPNE QNCKE 18GX3.5IN LF --

## (undated) DEVICE — [RESECTOR CUTTER, ARTHROSCOPIC SHAVER BLADE,  DO NOT RESTERILIZE,  DO NOT USE IF PACKAGE IS DAMAGED,  KEEP DRY,  KEEP AWAY FROM SUNLIGHT]: Brand: FORMULA

## (undated) DEVICE — STRIP SKIN CLSR W1XL5IN NYL REINF CURAD

## (undated) DEVICE — SOFT SILICONE HYDROCELLULAR FOAM DRESSING WITH LOCK AWAY LAYER: Brand: ALLEVYN LIFE XL 21X21 CTN10

## (undated) DEVICE — SYR 5ML 1/5 GRAD LL NSAF LF --

## (undated) DEVICE — BANDAGE COBAN 6 IN WND 6INX5YD FOAM

## (undated) DEVICE — NEEDLE HYPO 18GA L1.5IN PNK S STL HUB POLYPR SHLD REG BVL

## (undated) DEVICE — GARMENT,MEDLINE,DVT,INT,CALF,MED, GEN2: Brand: MEDLINE

## (undated) DEVICE — MASTISOL ADHESIVE LIQ 2/3ML

## (undated) DEVICE — STRIP,CLOSURE,WOUND,MEDI-STRIP,1/2X4: Brand: MEDLINE

## (undated) DEVICE — STOCKINETTE ORTH W9XL36IN COT 2 PLY HLLW FOR HANDLING LMB

## (undated) DEVICE — [THREADED CANNULA.  DO NOT RESTERILIZE,  DO NOT USE IF PACKAGE IS DAMAGED]: Brand: DRI-LOK

## (undated) DEVICE — REM POLYHESIVE ADULT PATIENT RETURN ELECTRODE: Brand: VALLEYLAB

## (undated) DEVICE — 4-PORT MANIFOLD: Brand: NEPTUNE 2

## (undated) DEVICE — INTENDED FOR TISSUE SEPARATION, AND OTHER PROCEDURES THAT REQUIRE A SHARP SURGICAL BLADE TO PUNCTURE OR CUT.: Brand: BARD-PARKER ® STAINLESS STEEL BLADES

## (undated) DEVICE — SYR 50ML LR LCK 1ML GRAD NSAF --

## (undated) DEVICE — SUTURE MCRYL SZ 3-0 L27IN ABSRB UD L19MM PS-2 3/8 CIR PRIM Y427H

## (undated) DEVICE — SET IRRIG W 96IN TBNG 4 LN FLX BG

## (undated) DEVICE — RESTRAINT UNIVERSAL HEAD DISP --

## (undated) DEVICE — BUTTON SWITCH PENCIL BLADE ELECTRODE, HOLSTER: Brand: EDGE